# Patient Record
Sex: MALE
[De-identification: names, ages, dates, MRNs, and addresses within clinical notes are randomized per-mention and may not be internally consistent; named-entity substitution may affect disease eponyms.]

---

## 2020-01-23 ENCOUNTER — HOSPITAL ENCOUNTER (OUTPATIENT)
Dept: HOSPITAL 5 - OR | Age: 50
Discharge: HOME | End: 2020-01-23
Attending: SURGERY
Payer: COMMERCIAL

## 2020-01-23 VITALS — SYSTOLIC BLOOD PRESSURE: 136 MMHG | DIASTOLIC BLOOD PRESSURE: 85 MMHG

## 2020-01-23 DIAGNOSIS — F17.210: ICD-10-CM

## 2020-01-23 DIAGNOSIS — Z79.899: ICD-10-CM

## 2020-01-23 DIAGNOSIS — K43.9: Primary | ICD-10-CM

## 2020-01-23 DIAGNOSIS — Z98.890: ICD-10-CM

## 2020-01-23 DIAGNOSIS — E66.9: ICD-10-CM

## 2020-01-23 PROCEDURE — 49585: CPT

## 2020-01-23 PROCEDURE — 88302 TISSUE EXAM BY PATHOLOGIST: CPT

## 2020-01-23 PROCEDURE — C1781 MESH (IMPLANTABLE): HCPCS

## 2020-01-23 NOTE — SHORT STAY SUMMARY
Short Stay Documentation


Date of service: 01/23/20





- History


H&P: obtained from office





- Allergies and Medications


Current Medications: 


                                    Allergies





No Known Allergies Allergy (Verified 01/16/20 17:18)


   





                                Home Medications











 Medication  Instructions  Recorded  Confirmed  Last Taken  Type


 


No Known Home Medications [No  01/16/20 01/16/20 Unknown History





Reported Home Medications]     








Active Medications





Celecoxib (Celebrex)  400 mg PO PREOP NR


   Stop: 01/23/20 23:59


Gabapentin (Gabapentin)  1,200 mg PO PREOP BUTCH


   Stop: 01/23/20 23:59


Sodium Chloride (Nacl 0.9% 1000 Ml)  1,000 mls @ 100 mls/hr IV AS DIRECT BUTCH


Cefazolin Sodium (Ancef/Sterile Water 2 Gm/20 Ml)  2 gm in 20 mls @ 80 mls/hr IV

PREOP NR; Protocol


   Stop: 01/23/20 23:59











- Physical exam


General appearance: no acute distress


HEENT: Atraumatic


Lungs: Normal air movement


Gastrointestinal: other (umbilical hernia)


Neurological: Normal speech





- Brief post op/procedure progress note


Date of procedure: 01/23/20 (dictation:[id number])


Pre-op diagnosis: symptomatic umbilical hernia


Post-op diagnosis: same


Procedure: 





dx lap, open umbo repair with mesh, excision of redundant skin





IVF 1250cc


EBL min


Anesthesia: GETA


Findings: 





3cm fascial defect at umbo


Surgeon: NIALL PARADA


Assistant: CHRISTINA RAMOS


Pathology: list (umbilical skin)


Specimen disposition: to lab


Condition: stable





- Hospital course


Hospital course: 





uneventful





- Disposition


Condition at discharge: Stable


Disposition: DC-01 TO HOME OR SELFCARE





Short Stay Discharge Plan


Activity: advance as tolerated


Diet: regular


Wound: open to air, keep clean and dry


Special Instructions: no heavy lifting, other (empty drain daily and record 

output.)


Additional Instructions: 


Post Operative Instructions





Activity: no heavy lifting for next 1 week. 





May shower tomorrow.  Pat dry the wound or wounds. Keep incision sites clean and

dry 





After surgery, start with a light diet. Consider starting with liquids. If you 

do well, you can advance to a regular diet as you feel comfortable.





Wear the abdominal binder during the day. Try to wear at night if you are able 

to sleep with it on. May remove for showering. Replace gauze in belly button 

area after showering.





Apply an ice pack to the wound or wounds for 10-20 minutes at a time.  Do this 

at least 4-5 times a day.  You can do it more if he would like. 





Pain Medication Schedule for the first 2 days after surgery:


Gabapentin 600mg twice a day


Celebrex (celecoxib) 200mg twice a day


Tylenol 500mg four times a day (every 6 hours)





After the first 2 days, then take alternating doses of ibuprofen and Tylenol as 

needed for pain. Take 600 mg of ibuprofen every 6 hours as needed.  Take 500 mg 

of Tylenol every 6 hours as needed.    You should alternate these 2 medicines.  

Make sure you take the ibuprofen with food. 





 It is very important that you use the prescription narcotic pain medicine 

(hydrocodone) only for very severe pain. Do not take the narcotic medicine 

before you try using all the medications listed above.





We will call you in a couple of days to see how youre doing.  If you have any 

questions or concerns, always feel free to call the clinic ([phone]) at any

time.


Follow up with: 


ALEKS CARRIZALES MD [Primary Care Provider] - 7 Days


NIALL PARADA MD [Staff Physician] - 7 Days


Forms:  [facility]patient [facility] DC Inst[facility]


Prescriptions: 


Celecoxib [celeBREX] 200 mg PO BID #4 capsule


Gabapentin 900 mg PO BID #12 capsule


HYDROcodone/APAP 5-325 [Norco 5-325 mg TAB] 1 each PO Q6HR PRN #20 tablet


 PRN Reason: Pain


Acetaminophen [Tylenol] 650 mg PO QID #16 capsule

## 2020-01-23 NOTE — ANESTHESIA CONSULTATION
Anesthesia Consult and Med Hx


Date of service: 01/23/20





- Airway


Anesthetic Teeth Evaluation: Good


ROM Head & Neck: Adequate


Mental/Hyoid Distance: Adequate


Mallampati Class: Class II


Intubation Access Assessment: Good





- Pulmonary Exam


CTA: Yes





- Cardiac Exam


Cardiac Exam: No Murmur





- Pre-Operative Health Status


ASA Pre-Surgery Classification: ASA2


Proposed Anesthetic Plan: General





- Pulmonary


Hx Smoking: Yes (AT LEAST 40YRS; 1PPD NOW (STATED UNABLE TO STOP FOR SURGERY))





- Other Systems


Hx Alcohol Use: No


Hx Substance Use: No


Hx Cancer: No


Hx Obesity: Yes (BMI 33)

## 2020-01-24 NOTE — OPERATIVE REPORT
OPERATIVE NOTE



PREOPERATIVE DIAGNOSIS:  Symptomatic umbilical hernia.



POSTOPERATIVE DIAGNOSIS:  Symptomatic umbilical hernia.



PROCEDURES:

1.  Diagnostic laparoscopy.

2.  Open umbilical hernia repair with mesh.



ATTENDING PHYSICIAN:  Aba Guzman MD



ASSISTANT:  Dr. Vang.



ANESTHESIA:  General.



ESTIMATED BLOOD LOSS:  Minimal.



FLUIDS:  1250 mL.  Minimal blood loss.



FINDINGS:  Approximately 2.5 cm umbilical hernia, oriented in a transverse

manner, no incarcerated contents.  Overlying skin was showing signs of

breakdown, but no full-thickness perforation had occurred yet.  Skin was very

thin.  Rest of the intraabdominal cavity was normal.



IMPLANTS:  Large Ventralex [place].



DRAINS:  None.



COMPLICATIONS:  None.



DISPOSITION:  Stable, transferred to Recovery Room.



INDICATIONS:  This is a 49-year-old male who presented with a painful umbilical

hernia that had been growing over the years.  The patient wished to have it

repaired.  The patient appeared to be in need of repair.  However, the patient

was current smoker in the clinic.  We discussed the importance of stopping the

smoking.  The patient indicated that he would be unable to and he needed to get

this done as soon as possible as he is a  and needs to get back to

work ASAP.  I made sure that he understood that we were at increased risks of

complications as well as hernia recurrence risk.  The patient understood and

wished to proceed.  Procedure, risks, and benefits were explained.  Risks

included but were not limited to infection, bleeding, pain, injury to

surrounding structures, possible recurrence, and possible need for further

procedures in the future.  The patient understood and consented.



OPERATIVE NOTE:  The patient was brought to the operating room and placed on the

table in supine position.  After adequate general anesthesia was established,

the patient was positioned for robotic repair.  All pressure points were padded.

 Sterile prep and drape was performed.  SCDs were in place.  Antibiotics had

been given.  Time-out was called.  I began by placing a Veress needle in left

upper quadrant.  I was able to insufflate the abdomen in first attempt. 

Immediately once we began insufflating the umbilical skin popped up as the

abdomen was insufflated, I took the opportunity to thoroughly evaluate the

defect as I was limited due to his discomfort in the office, I paused here

because I was concerned that as he is at higher risk for complications due to

smoking, if I do a thorough dissection for a preperitoneal repair if he suffers

a complication, this may set him back quite a bit such that he would not be able

to return to work as soon as he wishes and that may affect his financial

situation.  I felt that as the skin is very thin, as there is no real dissection

that needs to be done if I were just to do an open repair, which I would have to

cut the skin anyway because he has so much excess, our plan was to trim that

excess and then close that area.  Since I was going to do that anyway, I might

as well just due to a straightforward open repair, which would limit the

dissection and hopefully limit his potential for complications involving the

wound.  I discussed this with Dr. Vang.  We ultimately moved forward with that

plan and infraumbilical curvilinear incision was made.  Skin was easily elevated

and then we could see the fascial opening very easily, no further dissection was

necessary.  We cleared off the tissue around the fascia, so that we would have

good edges for closure.  A large Ventralex surgical [place] was inserted.  We

placed interrupted 0 Ethibond sutures into the fascia and incorporated the mesh.

 We had previously placed 5 mm port where the Veress needle was using the

Optiview technique.  We then inspected the internal cavity.  Again, we had

already done at once and found no abnormalities other than the umbilical

opening.  Now, we could see the mesh lying very nicely.  There were no areas of

concerns such that we felt that the tacking was required.  I went ahead and

closed the fascia.  We had a very nice closure.  The mesh continued to be in

very good position, and no tacking was required.  Nothing had gotten in between

the mesh and the anterior abdominal wall.  I then injected a large amount of

local into the surrounding tissue, so that hopefully he would wake up with

minimal discomfort.  A 3-0 Vicryl was used to tack down the skin in various

locations.  Please note we had excised a significant amount of skin and sent

that to pathology.  I then closed the skin with a running 4-0 Monocryl

subcuticular stitch.  Skin was cleaned and dried.  Dermabond was placed.  The

patient tolerated the procedure well.  There were no complications.  We placed a

wad of gauze in the center of the new umbilicus to hold it down to help with

scar into that position and hopefully minimize a seroma development.  I spoke

with the yamilex at the end of the case.





DD: 01/24/2020 14:58

DT: 01/24/2020 15:12

[facility]# [id number]  [id number]

LUZ/JEREMY

## 2021-06-09 ENCOUNTER — HOSPITAL ENCOUNTER (OUTPATIENT)
Dept: HOSPITAL 5 - WOUND | Age: 51
Discharge: HOME | End: 2021-06-09
Attending: SURGERY
Payer: COMMERCIAL

## 2021-06-09 DIAGNOSIS — K74.60: ICD-10-CM

## 2021-06-09 DIAGNOSIS — L97.812: ICD-10-CM

## 2021-06-09 DIAGNOSIS — I87.311: Primary | ICD-10-CM

## 2021-06-09 DIAGNOSIS — L97.312: ICD-10-CM

## 2021-06-09 DIAGNOSIS — F17.210: ICD-10-CM

## 2021-08-02 ENCOUNTER — HOSPITAL ENCOUNTER (OUTPATIENT)
Dept: HOSPITAL 5 - WOUND | Age: 51
Discharge: HOME | End: 2021-08-02
Attending: SURGERY
Payer: COMMERCIAL

## 2021-08-02 DIAGNOSIS — K74.60: ICD-10-CM

## 2021-08-02 DIAGNOSIS — L97.812: ICD-10-CM

## 2021-08-02 DIAGNOSIS — I87.311: Primary | ICD-10-CM

## 2021-08-02 DIAGNOSIS — L97.312: ICD-10-CM

## 2021-08-02 DIAGNOSIS — F17.210: ICD-10-CM

## 2021-08-16 ENCOUNTER — HOSPITAL ENCOUNTER (OUTPATIENT)
Dept: HOSPITAL 5 - WOUND | Age: 51
Discharge: HOME | End: 2021-08-16
Attending: SURGERY
Payer: COMMERCIAL

## 2021-08-16 DIAGNOSIS — L97.312: ICD-10-CM

## 2021-08-16 DIAGNOSIS — I87.311: Primary | ICD-10-CM

## 2021-08-16 DIAGNOSIS — L97.812: ICD-10-CM

## 2021-08-16 DIAGNOSIS — F17.210: ICD-10-CM

## 2021-08-16 DIAGNOSIS — K74.60: ICD-10-CM

## 2021-08-16 PROCEDURE — G0463 HOSPITAL OUTPT CLINIC VISIT: HCPCS

## 2021-08-16 PROCEDURE — 99211 OFF/OP EST MAY X REQ PHY/QHP: CPT
